# Patient Record
Sex: FEMALE | Race: WHITE | URBAN - METROPOLITAN AREA
[De-identification: names, ages, dates, MRNs, and addresses within clinical notes are randomized per-mention and may not be internally consistent; named-entity substitution may affect disease eponyms.]

---

## 2023-10-17 LAB
C. TRACHOMATIS, EXTERNAL RESULT: NEGATIVE
N. GONORRHOEAE, EXTERNAL RESULT: NEGATIVE

## 2023-11-03 LAB
ABO, EXTERNAL RESULT: NORMAL
HEP B, EXTERNAL RESULT: NEGATIVE
HEPATITIS C ANTIBODY, EXTERNAL RESULT: NEGATIVE
HIV, EXTERNAL RESULT: NORMAL
RH FACTOR, EXTERNAL RESULT: POSITIVE
RPR, EXTERNAL RESULT: NORMAL
RUBELLA TITER, EXTERNAL RESULT: NORMAL

## 2024-05-14 LAB — GBS, EXTERNAL RESULT: NEGATIVE

## 2024-06-06 ENCOUNTER — HOSPITAL ENCOUNTER (INPATIENT)
Age: 24
LOS: 2 days | Discharge: HOME | End: 2024-06-08
Attending: OBSTETRICS & GYNECOLOGY | Admitting: OBSTETRICS & GYNECOLOGY
Payer: COMMERCIAL

## 2024-06-06 ENCOUNTER — ANESTHESIA (OUTPATIENT)
Dept: LABOR AND DELIVERY | Age: 24
End: 2024-06-06
Payer: COMMERCIAL

## 2024-06-06 ENCOUNTER — ANESTHESIA EVENT (OUTPATIENT)
Dept: LABOR AND DELIVERY | Age: 24
End: 2024-06-06
Payer: COMMERCIAL

## 2024-06-06 PROBLEM — Z37.9 NORMAL LABOR: Status: ACTIVE | Noted: 2024-06-06

## 2024-06-06 LAB
ABO + RH BLD: NORMAL
BASOPHILS # BLD: 0.1 K/UL (ref 0–0.2)
BASOPHILS NFR BLD: 0.5 %
BLD GP AB SCN SERPL QL: NORMAL
DEPRECATED RDW RBC AUTO: 13.9 % (ref 12.4–15.4)
EOSINOPHIL # BLD: 0 K/UL (ref 0–0.6)
EOSINOPHIL NFR BLD: 0.1 %
HCT VFR BLD AUTO: 41.4 % (ref 36–48)
HGB BLD-MCNC: 14 G/DL (ref 12–16)
LYMPHOCYTES # BLD: 1.3 K/UL (ref 1–5.1)
LYMPHOCYTES NFR BLD: 7.5 %
MCH RBC QN AUTO: 28.5 PG (ref 26–34)
MCHC RBC AUTO-ENTMCNC: 33.8 G/DL (ref 31–36)
MCV RBC AUTO: 84.2 FL (ref 80–100)
MONOCYTES # BLD: 0.6 K/UL (ref 0–1.3)
MONOCYTES NFR BLD: 3.6 %
NEUTROPHILS # BLD: 15.5 K/UL (ref 1.7–7.7)
NEUTROPHILS NFR BLD: 88.3 %
PLATELET # BLD AUTO: 234 K/UL (ref 135–450)
PMV BLD AUTO: 9.3 FL (ref 5–10.5)
RBC # BLD AUTO: 4.91 M/UL (ref 4–5.2)
WBC # BLD AUTO: 17.6 K/UL (ref 4–11)

## 2024-06-06 PROCEDURE — 85025 COMPLETE CBC W/AUTO DIFF WBC: CPT

## 2024-06-06 PROCEDURE — 10907ZC DRAINAGE OF AMNIOTIC FLUID, THERAPEUTIC FROM PRODUCTS OF CONCEPTION, VIA NATURAL OR ARTIFICIAL OPENING: ICD-10-PCS | Performed by: OBSTETRICS & GYNECOLOGY

## 2024-06-06 PROCEDURE — 0HQ9XZZ REPAIR PERINEUM SKIN, EXTERNAL APPROACH: ICD-10-PCS | Performed by: OBSTETRICS & GYNECOLOGY

## 2024-06-06 PROCEDURE — 6360000002 HC RX W HCPCS: Performed by: NURSE ANESTHETIST, CERTIFIED REGISTERED

## 2024-06-06 PROCEDURE — 86780 TREPONEMA PALLIDUM: CPT

## 2024-06-06 PROCEDURE — 86850 RBC ANTIBODY SCREEN: CPT

## 2024-06-06 PROCEDURE — 86901 BLOOD TYPING SEROLOGIC RH(D): CPT

## 2024-06-06 PROCEDURE — 86900 BLOOD TYPING SEROLOGIC ABO: CPT

## 2024-06-06 PROCEDURE — 1220000000 HC SEMI PRIVATE OB R&B

## 2024-06-06 PROCEDURE — 3700000025 EPIDURAL BLOCK: Performed by: ANESTHESIOLOGY

## 2024-06-06 PROCEDURE — 2500000003 HC RX 250 WO HCPCS: Performed by: NURSE ANESTHETIST, CERTIFIED REGISTERED

## 2024-06-06 RX ORDER — ACETAMINOPHEN 325 MG/1
650 TABLET ORAL EVERY 4 HOURS PRN
Status: DISCONTINUED | OUTPATIENT
Start: 2024-06-06 | End: 2024-06-06

## 2024-06-06 RX ORDER — CARBOPROST TROMETHAMINE 250 UG/ML
250 INJECTION, SOLUTION INTRAMUSCULAR PRN
Status: DISCONTINUED | OUTPATIENT
Start: 2024-06-06 | End: 2024-06-08 | Stop reason: HOSPADM

## 2024-06-06 RX ORDER — SODIUM CHLORIDE 0.9 % (FLUSH) 0.9 %
5-40 SYRINGE (ML) INJECTION PRN
Status: DISCONTINUED | OUTPATIENT
Start: 2024-06-06 | End: 2024-06-06

## 2024-06-06 RX ORDER — TRANEXAMIC ACID 10 MG/ML
1000 INJECTION, SOLUTION INTRAVENOUS
Status: ACTIVE | OUTPATIENT
Start: 2024-06-06 | End: 2024-06-07

## 2024-06-06 RX ORDER — SODIUM CHLORIDE 0.9 % (FLUSH) 0.9 %
5-40 SYRINGE (ML) INJECTION PRN
Status: DISCONTINUED | OUTPATIENT
Start: 2024-06-06 | End: 2024-06-08 | Stop reason: HOSPADM

## 2024-06-06 RX ORDER — BUPIVACAINE HYDROCHLORIDE 2.5 MG/ML
INJECTION, SOLUTION EPIDURAL; INFILTRATION; INTRACAUDAL PRN
Status: DISCONTINUED | OUTPATIENT
Start: 2024-06-06 | End: 2024-06-06 | Stop reason: SDUPTHER

## 2024-06-06 RX ORDER — LANOLIN 100 %
OINTMENT (GRAM) TOPICAL PRN
Status: DISCONTINUED | OUTPATIENT
Start: 2024-06-06 | End: 2024-06-08 | Stop reason: HOSPADM

## 2024-06-06 RX ORDER — ONDANSETRON 2 MG/ML
4 INJECTION INTRAMUSCULAR; INTRAVENOUS EVERY 6 HOURS PRN
Status: DISCONTINUED | OUTPATIENT
Start: 2024-06-06 | End: 2024-06-08 | Stop reason: HOSPADM

## 2024-06-06 RX ORDER — SODIUM CHLORIDE, SODIUM LACTATE, POTASSIUM CHLORIDE, AND CALCIUM CHLORIDE .6; .31; .03; .02 G/100ML; G/100ML; G/100ML; G/100ML
500 INJECTION, SOLUTION INTRAVENOUS PRN
Status: DISCONTINUED | OUTPATIENT
Start: 2024-06-06 | End: 2024-06-06

## 2024-06-06 RX ORDER — SODIUM CHLORIDE, SODIUM LACTATE, POTASSIUM CHLORIDE, CALCIUM CHLORIDE 600; 310; 30; 20 MG/100ML; MG/100ML; MG/100ML; MG/100ML
INJECTION, SOLUTION INTRAVENOUS CONTINUOUS
Status: DISCONTINUED | OUTPATIENT
Start: 2024-06-06 | End: 2024-06-08 | Stop reason: HOSPADM

## 2024-06-06 RX ORDER — SODIUM CHLORIDE, SODIUM LACTATE, POTASSIUM CHLORIDE, AND CALCIUM CHLORIDE .6; .31; .03; .02 G/100ML; G/100ML; G/100ML; G/100ML
1000 INJECTION, SOLUTION INTRAVENOUS PRN
Status: DISCONTINUED | OUTPATIENT
Start: 2024-06-06 | End: 2024-06-06

## 2024-06-06 RX ORDER — ONDANSETRON 4 MG/1
4 TABLET, ORALLY DISINTEGRATING ORAL EVERY 6 HOURS PRN
Status: DISCONTINUED | OUTPATIENT
Start: 2024-06-06 | End: 2024-06-08 | Stop reason: HOSPADM

## 2024-06-06 RX ORDER — TERBUTALINE SULFATE 1 MG/ML
0.25 INJECTION, SOLUTION SUBCUTANEOUS
Status: DISCONTINUED | OUTPATIENT
Start: 2024-06-06 | End: 2024-06-06

## 2024-06-06 RX ORDER — FERROUS SULFATE 325(65) MG
325 TABLET ORAL
COMMUNITY

## 2024-06-06 RX ORDER — SODIUM CHLORIDE 0.9 % (FLUSH) 0.9 %
5-40 SYRINGE (ML) INJECTION EVERY 12 HOURS SCHEDULED
Status: DISCONTINUED | OUTPATIENT
Start: 2024-06-06 | End: 2024-06-08 | Stop reason: HOSPADM

## 2024-06-06 RX ORDER — METHYLERGONOVINE MALEATE 0.2 MG/ML
200 INJECTION INTRAVENOUS PRN
Status: DISCONTINUED | OUTPATIENT
Start: 2024-06-06 | End: 2024-06-08 | Stop reason: HOSPADM

## 2024-06-06 RX ORDER — SODIUM CHLORIDE 9 MG/ML
25 INJECTION, SOLUTION INTRAVENOUS PRN
Status: DISCONTINUED | OUTPATIENT
Start: 2024-06-06 | End: 2024-06-06

## 2024-06-06 RX ORDER — SODIUM CHLORIDE 0.9 % (FLUSH) 0.9 %
5-40 SYRINGE (ML) INJECTION EVERY 12 HOURS SCHEDULED
Status: DISCONTINUED | OUTPATIENT
Start: 2024-06-06 | End: 2024-06-06

## 2024-06-06 RX ORDER — IBUPROFEN 800 MG/1
800 TABLET ORAL EVERY 8 HOURS SCHEDULED
Status: DISCONTINUED | OUTPATIENT
Start: 2024-06-07 | End: 2024-06-08 | Stop reason: HOSPADM

## 2024-06-06 RX ORDER — FERROUS SULFATE 325(65) MG
325 TABLET ORAL EVERY OTHER DAY
Status: DISCONTINUED | OUTPATIENT
Start: 2024-06-07 | End: 2024-06-08 | Stop reason: HOSPADM

## 2024-06-06 RX ORDER — MISOPROSTOL 100 UG/1
400 TABLET ORAL PRN
Status: DISCONTINUED | OUTPATIENT
Start: 2024-06-06 | End: 2024-06-08 | Stop reason: HOSPADM

## 2024-06-06 RX ORDER — KETOROLAC TROMETHAMINE 30 MG/ML
30 INJECTION, SOLUTION INTRAMUSCULAR; INTRAVENOUS EVERY 6 HOURS
Status: DISPENSED | OUTPATIENT
Start: 2024-06-06 | End: 2024-06-07

## 2024-06-06 RX ORDER — SODIUM CHLORIDE 9 MG/ML
INJECTION, SOLUTION INTRAVENOUS PRN
Status: DISCONTINUED | OUTPATIENT
Start: 2024-06-06 | End: 2024-06-08 | Stop reason: HOSPADM

## 2024-06-06 RX ORDER — ACETAMINOPHEN 500 MG
1000 TABLET ORAL EVERY 8 HOURS SCHEDULED
Status: DISCONTINUED | OUTPATIENT
Start: 2024-06-06 | End: 2024-06-08 | Stop reason: HOSPADM

## 2024-06-06 RX ORDER — DOCUSATE SODIUM 100 MG/1
100 CAPSULE, LIQUID FILLED ORAL 2 TIMES DAILY
Status: DISCONTINUED | OUTPATIENT
Start: 2024-06-06 | End: 2024-06-08 | Stop reason: HOSPADM

## 2024-06-06 RX ADMIN — Medication 15 ML/HR: at 20:15

## 2024-06-06 RX ADMIN — BUPIVACAINE HYDROCHLORIDE 1.3 ML: 2.5 INJECTION, SOLUTION EPIDURAL; INFILTRATION; INTRACAUDAL; PERINEURAL at 20:06

## 2024-06-06 NOTE — PROGRESS NOTES
Pt arrived to triage sent over from the office due to contractions every 5 minutes that she feels in her lower abdomen in back. Pt rates her pain 5/10. Baby has been active and denies vaginal bleeding and gushes of fluid

## 2024-06-06 NOTE — H&P
Department of Obstetrics and Gynecology   Obstetrics History and Physical        CHIEF COMPLAINT:  contractions    HISTORY OF PRESENT ILLNESS:      The patient is a 23 y.o. female at 39w2d.  OB History          1    Para        Term                AB        Living             SAB        IAB        Ectopic        Molar        Multiple        Live Births                Patient presents with a chief complaint as above and is being admitted for active phase labor    Estimated Due Date: Estimated Date of Delivery: 24    PRENATAL CARE:    Complicated by: none    PAST OB HISTORY:  OB History          1    Para        Term                AB        Living             SAB        IAB        Ectopic        Molar        Multiple        Live Births                    Past Medical History:    No past medical history on file.  Past Surgical History:        Procedure Laterality Date    WISDOM TOOTH EXTRACTION       Allergies:  Penicillins    Social History:    Social History     Socioeconomic History    Marital status:      Spouse name: Not on file    Number of children: Not on file    Years of education: Not on file    Highest education level: Not on file   Occupational History    Not on file   Tobacco Use    Smoking status: Never    Smokeless tobacco: Never   Substance and Sexual Activity    Alcohol use: Not Currently    Drug use: Never    Sexual activity: Yes     Partners: Male   Other Topics Concern    Not on file   Social History Narrative    Not on file     Social Determinants of Health     Financial Resource Strain: Not on file   Food Insecurity: Not on file   Transportation Needs: Not on file   Physical Activity: Not on file   Stress: Not on file   Social Connections: Not on file   Intimate Partner Violence: Not on file   Housing Stability: Not on file     Family History:   No family history on file.  Medications Prior to Admission:  Medications Prior to Admission: ferrous sulfate

## 2024-06-06 NOTE — PROGRESS NOTES
Pt off monitor to walk halls again. Pt lives [place] hour away and really uncomfortable. Does not want to leave at this time. This RN notified Dr Ibarra. Will reassess SVE in a couple of hours.

## 2024-06-07 LAB — REAGIN+T PALLIDUM IGG+IGM SERPL-IMP: NORMAL

## 2024-06-07 PROCEDURE — 1220000000 HC SEMI PRIVATE OB R&B

## 2024-06-07 PROCEDURE — 2580000003 HC RX 258: Performed by: OBSTETRICS & GYNECOLOGY

## 2024-06-07 PROCEDURE — 6360000002 HC RX W HCPCS: Performed by: OBSTETRICS & GYNECOLOGY

## 2024-06-07 PROCEDURE — 6370000000 HC RX 637 (ALT 250 FOR IP): Performed by: OBSTETRICS & GYNECOLOGY

## 2024-06-07 PROCEDURE — 7200000001 HC VAGINAL DELIVERY

## 2024-06-07 RX ADMIN — ACETAMINOPHEN 1000 MG: 500 TABLET ORAL at 16:28

## 2024-06-07 RX ADMIN — DOCUSATE SODIUM 100 MG: 100 CAPSULE, LIQUID FILLED ORAL at 21:14

## 2024-06-07 RX ADMIN — IBUPROFEN 800 MG: 800 TABLET, FILM COATED ORAL at 21:13

## 2024-06-07 RX ADMIN — KETOROLAC TROMETHAMINE 30 MG: 30 INJECTION, SOLUTION INTRAMUSCULAR at 04:31

## 2024-06-07 RX ADMIN — IBUPROFEN 800 MG: 800 TABLET, FILM COATED ORAL at 12:37

## 2024-06-07 RX ADMIN — ACETAMINOPHEN 1000 MG: 500 TABLET ORAL at 08:21

## 2024-06-07 RX ADMIN — ACETAMINOPHEN 1000 MG: 500 TABLET ORAL at 23:56

## 2024-06-07 RX ADMIN — Medication 10 ML: at 21:14

## 2024-06-07 RX ADMIN — DOCUSATE SODIUM 100 MG: 100 CAPSULE, LIQUID FILLED ORAL at 04:33

## 2024-06-07 ASSESSMENT — PAIN DESCRIPTION - LOCATION
LOCATION: PERINEUM;BACK
LOCATION: ABDOMEN
LOCATION: ABDOMEN;BACK
LOCATION: ABDOMEN;BACK

## 2024-06-07 ASSESSMENT — PAIN - FUNCTIONAL ASSESSMENT
PAIN_FUNCTIONAL_ASSESSMENT: ACTIVITIES ARE NOT PREVENTED

## 2024-06-07 ASSESSMENT — PAIN SCALES - GENERAL
PAINLEVEL_OUTOF10: 3
PAINLEVEL_OUTOF10: 2
PAINLEVEL_OUTOF10: 2
PAINLEVEL_OUTOF10: 3
PAINLEVEL_OUTOF10: 2
PAINLEVEL_OUTOF10: 2

## 2024-06-07 ASSESSMENT — PAIN DESCRIPTION - DESCRIPTORS
DESCRIPTORS: DISCOMFORT;SORE
DESCRIPTORS: CRAMPING

## 2024-06-07 ASSESSMENT — PAIN DESCRIPTION - ORIENTATION
ORIENTATION: LOWER
ORIENTATION: LOWER

## 2024-06-07 NOTE — LACTATION NOTE
This note was copied from a baby's chart.  LACTATION CONSULTATION      Follow-up Consult: Reason for Follow-up: education, latch assist       Name: Girl Charity Matthews       MRN: [id number]               YOB: 2024   Time of Birth: 9:38 PM   Gestational age: Gestational Age: 39w2d   Birth Weight: Birth Weight: 3.218 kg (7 lb 1.5 oz) Most Recent Weight: Weight: 3.218 kg (7 lb 1.5 oz) (Filed from Delivery Summary)   Weight Change from Birth: 0%            Maternal Assessment:      Maternal Data:   Information for the patient's mother:  Charity Matthews [[id number]]   23 y.o.    /Para:   Information for the patient's mother:  Charity Matthews [[id number]]        Information for the patient's mother:  Charity Matthews [[id number]]   39w2d       Intervention during consultation:     Interventions Performed:   Assisted with breastfeeding , Hand expression, and Education     Latch & Positioning: MOB with infant latched to the left breast in a football hold with the assist of FOB upon LC entry to room. Infant appeared to be latched shallow. Assisted to improve position and latch. Reviewed position and hold. Reviewed nipple to nose, compression on the breast and bringing infant up and onto the breast for latch. Infant able to latch well with short to moderate coordinated suck bursts, long jaw motions and swallows. Infant on the breast for several minutes and then MOB reported feeling a gush of blood. Called RN to evaluate so infant taken off the breast and to FOB to hold while MOB assisted by RN. Encouraged to call for continued assist.     Manual Expression:  Expresses easily, Drops obtained, and MOB states she understands     Education:  Latch & positioning  and Feeding frequency & feeding cues           Action/Plan:  Breastfeed on cue and at least 8 times/24 hours, unlimited timing. May not nurse this often in the first 24 hours. Wake baby and offer breastfeeding if it has been 3

## 2024-06-07 NOTE — BRIEF OP NOTE
Brief Postoperative Note      Patient: Charity Matthews  YOB: 2000  MRN: [id number]    Date of Procedure:     , VFI  Apgars   No epis/2nd degree perineal lac  Placenta spon   cc  \""    Electronically signed by KAPIL VICENTE MD on 2024 at 10:00 PM  
- - -

## 2024-06-07 NOTE — LACTATION NOTE
This note was copied from a baby's chart.  LACTATION CONSULTATION  Initial Lactation Consult:  Referred by: RN request and Census- feeding preference     Name: Girl Charity Matthews       MRN: [id number]         YOB: 2024   Time of Birth: 9:38 PM   Gestational age: Gestational Age: 39w2d   Birth Weight: Birth Weight: 3.218 kg (7 lb 1.5 oz) Most Recent Weight: Weight: 3.218 kg (7 lb 1.5 oz) (Filed from Delivery Summary)   Weight Change from Birth: 0%           Maternal Assessment:  Maternal Data:  Information for the patient's mother:  Kasey Matthewsjoleen HAY [[id number]]   23 y.o.   /Para:   Information for the patient's mother:  Charity Matthews SATNAM [[id number]]      Information for the patient's mother:  Kasey Matthewstney SATNAM [[id number]]   39w2d     Prenatal Breastfeeding Education: None    Prior Breastfeeding Experience: 1st time breastfeeding with this baby     Breastfeeding Goal: Exclusively Breastfeed     Breast Assessment  Right Breast: WDL  Right Nipple: Everts well  and Short  Right Areola: WDL   Right Nipple Comfort: 1st minute or less of breastfeeding   Right Nipple Integrity: Intact    Left Breast: WDL  Left Nipple: Everts well  and Short  Left Areola: WDL   Left Nipple Comfort: 1st minute or less of breastfeeding   Left Nipple Integrity: Intact    Medications of Concern:None    Maternal Toxicology:   Information for the patient's mother:  Charity Matthews [[id number]]   No results found for: \"LABAMPH\", \"BARBSCNU\", \"LABBENZ\", \"CANSU\", \"COCAIMETSCRU\", \"LABMETH\", \"PROPOX\", \"PHUR\", \"DSCOMMENT\"     Other significant maternal history: High BMI     Delivery Information:  Born on 2024 at 9:38 PM  Delivery method: Vaginal, Spontaneous [250]  Additional Information:  Forceps attempted? No [0]  Vacuum extractor attempted? No [0]    Infant Assessment:    DOL:1 hour  Feeding: Breastfeeding     Nipple Shield in Use: No    I&O adequacy:  Urine output: is not established  Stool output: is not  Size:   N/A    Amount of milk expressed:   N/A    Pump Arrangements:  Owns breast pump    Education:  Latch & positioning , Feeding frequency & feeding cues , Exclusive breastfeeding , Breastfeeding Booklet reviewed , No artificial nipples , Expected intake and output , Feeding and diaper log , Skin to skin , Engorgement prevention & management , Electric breast pump , Milk storage guidelines , Hand expression , [facility] , and [facility]     Action/Plan:  Breastfeed on cue and at least 8 times/24 hours, unlimited timing. May not nurse this often in the first 24 hours. Wake baby and offer breastfeeding if it has been 3 hours since the beginning of last feeding. Place infant skin to skin if infant will not breastfeed at 3 hours.   Hand express prior to latch to brooke nipple and entice infant to the breast.   It is important to use gentle stimulation during feeding to promote active eating. Offer both breasts at every feeding. Burp infant in between sides. Alternate which breast is used first.   Offer STS often while awake. Mother holding infant skin to skin between feedings will promote milk supply and allow infant to rest more deeply.   Maintain a feeding log until infant is gaining weight and seen by primary care physician.   Request breastfeeding assistance from LC or RN as needed.     Feeding Plan reviewed with: Mom    Response:   Verbalized understanding of education and instruction

## 2024-06-07 NOTE — PLAN OF CARE
Problem: Pain  Goal: Verbalizes/displays adequate comfort level or baseline comfort level  Outcome: Progressing     Problem: Postpartum  Goal: Experiences normal postpartum course  Description:  Postpartum OB-Pregnancy care plan goal which identifies if the mother is experiencing a normal postpartum course  Outcome: Progressing  Goal: Appropriate maternal -  bonding  Description:  Postpartum OB-Pregnancy care plan goal which identifies if the mother and  are bonding appropriately  Outcome: Progressing  Goal: Establishment of infant feeding pattern  Description:  Postpartum OB-Pregnancy care plan goal which identifies if the mother is establishing a feeding pattern with their   Outcome: Progressing  Goal: Incisions, wounds, or drain sites healing without S/S of infection  Outcome: Progressing  Flowsheets (Taken 2024 1019)  Incisions, Wounds, or Drain Sites Healing Without Sign and Symptoms of Infection: TWICE DAILY: Assess and document skin integrity     Problem: Infection - Adult  Goal: Absence of infection at discharge  Outcome: Progressing  Goal: Absence of infection during hospitalization  Outcome: Progressing     Problem: Safety - Adult  Goal: Free from fall injury  Outcome: Progressing     Problem: Discharge Planning  Goal: Discharge to home or other facility with appropriate resources  Outcome: Progressing     Problem: Chronic Conditions and Co-morbidities  Goal: Patient's chronic conditions and co-morbidity symptoms are monitored and maintained or improved  Outcome: Progressing

## 2024-06-07 NOTE — PLAN OF CARE
Problem: Pain  Goal: Verbalizes/displays adequate comfort level or baseline comfort level  2024 by Jacque Aguillno RN  Outcome: Progressing  2024 by Leah Alvarez RN  Outcome: Progressing     Problem: Postpartum  Goal: Experiences normal postpartum course  Description:  Postpartum OB-Pregnancy care plan goal which identifies if the mother is experiencing a normal postpartum course  2024 by Jacque Aguillon RN  Outcome: Progressing  2024 by Leah Alvarez RN  Outcome: Progressing  Goal: Appropriate maternal -  bonding  Description:  Postpartum OB-Pregnancy care plan goal which identifies if the mother and  are bonding appropriately  2024 by Jacque Aguillon RN  Outcome: Progressing  2024 by Leah Alvarez RN  Outcome: Progressing  Goal: Establishment of infant feeding pattern  Description:  Postpartum OB-Pregnancy care plan goal which identifies if the mother is establishing a feeding pattern with their   2024 by Jacque Aguillon RN  Outcome: Progressing  2024 by Leah Alvarez RN  Outcome: Progressing  Goal: Incisions, wounds, or drain sites healing without S/S of infection  2024 by Jacque Aguillon RN  Outcome: Progressing  2024 by Leah Alvarez RN  Outcome: Progressing  Flowsheets (Taken 2024 1019)  Incisions, Wounds, or Drain Sites Healing Without Sign and Symptoms of Infection: TWICE DAILY: Assess and document skin integrity     Problem: Infection - Adult  Goal: Absence of infection at discharge  2024 by Jacque Aguillon RN  Outcome: Progressing  2024 by Leah Alvarez RN  Outcome: Progressing  Goal: Absence of infection during hospitalization  2024 by Jacque Aguillon RN  Outcome: Progressing  2024 by Leah Alvarez RN  Outcome: Progressing     Problem: Safety - Adult  Goal: Free from fall injury  2024 by Jacque Aguillon RN  Outcome:

## 2024-06-07 NOTE — ANESTHESIA PRE PROCEDURE
Department of Anesthesiology  Preprocedure Note       Name:  Charity Matthews   Age:  23 y.o.  :  2000                                          MRN:  [id number]         Date:  2024      Surgeon: * No surgeons listed *    Procedure: * No procedures listed *    Medications prior to admission:   Prior to Admission medications    Medication Sig Start Date End Date Taking? Authorizing Provider   ferrous sulfate (IRON 325) 325 (65 Fe) MG tablet Take 1 tablet by mouth daily (with breakfast)   Yes Jameel Gould MD   Prenatal MV-Min-Fe Fum-FA-DHA (PRENATAL 1 PO) Take by mouth   Yes ProviderJameel MD       Current medications:    Current Facility-Administered Medications   Medication Dose Route Frequency Provider Last Rate Last Admin    terbutaline (BRETHINE) injection 0.25 mg  0.25 mg SubCUTAneous Once PRN Richard Ibarra MD        lactated ringers bolus 500 mL  500 mL IntraVENous PRN Richard Ibarra MD        Or    lactated ringers bolus 1,000 mL  1,000 mL IntraVENous PRN Richard Ibarra MD        sodium chloride flush 0.9 % injection 5-40 mL  5-40 mL IntraVENous 2 times per day Richard Ibarra MD        sodium chloride flush 0.9 % injection 5-40 mL  5-40 mL IntraVENous PRN Richard Ibarra MD        0.9 % sodium chloride infusion  25 mL IntraVENous PRN Richard Ibarra MD        acetaminophen (TYLENOL) tablet 650 mg  650 mg Oral Q4H PRN Richard Ibarra MD         Facility-Administered Medications Ordered in Other Encounters   Medication Dose Route Frequency Provider Last Rate Last Admin    BUPivacaine (PF) (MARCAINE) 0.25 % injection   IntraSPINal PRN Crow Locke APRN - CRNA   1.3 mL at 24    sodium chloride 0.9 % 200 mL with fentaNYL 500 mcg, BUPivacaine 0.5% 50 mL (OB) epidural   Epidural Continuous PRN Crow Locke APRN - CRNA 15 mL/hr at 24 15 mL/hr at 24       Allergies:    Allergies   Allergen Reactions

## 2024-06-07 NOTE — L&D DELIVERY SUMMARY NOTE
[address] [address]                         LABOR AND DELIVERY NOTE      PATIENT NAME: SUZANNA MENDOZA           : 2000  MED REC NO: [id number]                      ROOM: 0387  ACCOUNT NO: [id number]                       ADMIT DATE: 2024  PROVIDER: Richard Vicente MD      DATE OF PROCEDURE:      23-year-old  1 who presented at 39 weeks 2 days in active labor.  The patient's prenatal course was uncomplicated.  Fetal heart tracing was reactive upon admission.  Group B beta strep culture was negative.    The patient did request and receive an epidural.  Amniotomy was performed for clear fluid.  The patient progressed to 2nd stage and after a 30-minute 2nd stage, delivered by spontaneous vaginal delivery of a viable female infant with Apgars 9 at one minute and 9 at five minutes.  No episiotomy was performed but a second-degree perineal laceration occurred with delivery.  Placenta delivered spontaneously grossly intact with a 3-vessel cord.  There were no cervical or vaginal lacerations.  Perineal laceration was repaired in normal fashion with 3-0 Vicryl.  Rectovaginal exam after repair noted intact rectal mucosa and rectal sphincter.  All sponges and needles were accounted for after delivery.  Estimated blood loss was 100 mL.  The patient named her little girl.Myah Potts.          RICHARD VICENTE MD      D:  2024 22:03:02     T:  2024 02:06:05     KLEBER/CHYNA  Job #:  [id number]     Doc#:  [id number]

## 2024-06-07 NOTE — PROGRESS NOTES
Department of Obstetrics and Gynecology  Labor and Delivery  Attending Post Partum Progress Note      SUBJECTIVE:  pt without complaints, lochia=menses, adequate pain control    OBJECTIVE:      Vitals:  /66   Pulse 85   Temp 99 °F (37.2 °C) (Oral)   Resp 16   Ht 1.626 m (5' 4\")   Wt 98.4 kg (217 lb)   SpO2 99%   Breastfeeding Unknown   BMI 37.25 kg/m²     ABDOMEN:  No scars, normal bowel sounds, soft, non-distended, non-tender, no masses palpated, no hepatosplenomegally  GENITAL/URINARY:  deferred    DATA:    CBC:    Lab Results   Component Value Date/Time    WBC 17.6 06/06/2024 07:35 PM    RBC 4.91 06/06/2024 07:35 PM    HGB 14.0 06/06/2024 07:35 PM    HCT 41.4 06/06/2024 07:35 PM    MCV 84.2 06/06/2024 07:35 PM    RDW 13.9 06/06/2024 07:35 PM     06/06/2024 07:35 PM       ASSESSMENT & PLAN:      Principal Problem:    Vaginal delivery PPD#1  Plan: cont nl pp care

## 2024-06-07 NOTE — PROGRESS NOTES
First time get up to BR w/ assist x 2 RN's. Pt voided 50 cc urine without difficulty. Pt performed mayank care per self after instruction. New ice pack, peripad, and underwear provided.  Pt back to bed w/ out incident, gait steady. Pt tolerated well.

## 2024-06-07 NOTE — ANESTHESIA POSTPROCEDURE EVALUATION
Department of Anesthesiology  Postprocedure Note    Patient: Charity Matthews  MRN: [id number]  YOB: 2000  Date of evaluation: 6/7/2024    Procedure Summary       Date: 06/06/24 Room / Location:     Anesthesia Start: 1956 Anesthesia Stop: 2138    Procedure: Labor Analgesia Diagnosis:     Scheduled Providers:  Responsible Provider: Saad Serna MD    Anesthesia Type: CSE ASA Status: 2            Anesthesia Type: No value filed.    Augusto Phase I: Augusto Score: 9    Augusto Phase II: Augusto Score: 10    Anesthesia Post Evaluation    Patient location during evaluation: bedside  Patient participation: complete - patient participated  Level of consciousness: awake and alert  Nausea & Vomiting: no nausea and no vomiting  Cardiovascular status: hemodynamically stable  Hydration status: stable  Pain management: adequate and satisfactory to patient        No notable events documented.

## 2024-06-07 NOTE — ANESTHESIA PROCEDURE NOTES
Epidural Block    Patient location during procedure: OB  Start time: 6/6/2024 7:56 PM  End time: 6/6/2024 8:15 PM  Reason for block: labor epidural  Staffing  Performed: resident/CRNA   Resident/CRNA: Crow Locke APRN - CRNA  Performed by: Crow Locke APRN - CRNA  Authorized by: Saad Serna MD    Epidural  Patient position: sitting  Prep: ChloraPrep  Patient monitoring: continuous pulse ox  Approach: midline  Location: L3-4  Injection technique: BOUCHRA saline  Provider prep: mask  Needle  Needle type: Tuohy   Needle gauge: 17 G  Needle length: 3.5 in  Needle insertion depth: 8 cm  Catheter type: side hole  Catheter size: 19 G  Catheter at skin depth: 13 cm  Test dose: negativeCatheter Secured: tegaderm and tape  Assessment  Sensory level: T8  Hemodynamics: stable  Attempts: 1  Outcomes: uncomplicated and patient tolerated procedure well  Additional Notes  Sitting, Sterile prep/drape, 1%Xylo at L3-4, 17ga Tuohy with BOUCHRA, 25ga Pencan for w/+CSF for CSE, 1.3ml 0.25% marcaine PF intrathecally, Pencan removed, Catheter inserted, negative test dose, sterile dressing applied.   Preanesthetic Checklist  Completed: patient identified, IV checked, site marked, risks and benefits discussed, surgical/procedural consents, equipment checked, pre-op evaluation, timeout performed, anesthesia consent given, oxygen available and monitors applied/VS acknowledged

## 2024-06-07 NOTE — PLAN OF CARE
Problem: Pain  Goal: Verbalizes/displays adequate comfort level or baseline comfort level  Outcome: Progressing     Problem: Postpartum  Goal: Experiences normal postpartum course  Description:  Postpartum OB-Pregnancy care plan goal which identifies if the mother is experiencing a normal postpartum course  Outcome: Progressing  Goal: Appropriate maternal -  bonding  Description:  Postpartum OB-Pregnancy care plan goal which identifies if the mother and  are bonding appropriately  Outcome: Progressing  Goal: Establishment of infant feeding pattern  Description:  Postpartum OB-Pregnancy care plan goal which identifies if the mother is establishing a feeding pattern with their   Outcome: Progressing  Goal: Incisions, wounds, or drain sites healing without S/S of infection  Outcome: Progressing     Problem: Infection - Adult  Goal: Absence of infection at discharge  Outcome: Progressing  Goal: Absence of infection during hospitalization  Outcome: Progressing     Problem: Safety - Adult  Goal: Free from fall injury  Outcome: Progressing     Problem: Discharge Planning  Goal: Discharge to home or other facility with appropriate resources  Outcome: Progressing     Problem: Chronic Conditions and Co-morbidities  Goal: Patient's chronic conditions and co-morbidity symptoms are monitored and maintained or improved  Outcome: Progressing

## 2024-06-08 VITALS
WEIGHT: 217 LBS | TEMPERATURE: 97.5 F | RESPIRATION RATE: 16 BRPM | BODY MASS INDEX: 37.05 KG/M2 | HEART RATE: 91 BPM | OXYGEN SATURATION: 98 % | HEIGHT: 64 IN | SYSTOLIC BLOOD PRESSURE: 125 MMHG | DIASTOLIC BLOOD PRESSURE: 78 MMHG

## 2024-06-08 LAB
DEPRECATED RDW RBC AUTO: 14.2 % (ref 12.4–15.4)
HCT VFR BLD AUTO: 36.2 % (ref 36–48)
HGB BLD-MCNC: 11.8 G/DL (ref 12–16)
MCH RBC QN AUTO: 27.8 PG (ref 26–34)
MCHC RBC AUTO-ENTMCNC: 32.6 G/DL (ref 31–36)
MCV RBC AUTO: 85.4 FL (ref 80–100)
PLATELET # BLD AUTO: 214 K/UL (ref 135–450)
PMV BLD AUTO: 8.8 FL (ref 5–10.5)
RBC # BLD AUTO: 4.24 M/UL (ref 4–5.2)
WBC # BLD AUTO: 12.5 K/UL (ref 4–11)

## 2024-06-08 PROCEDURE — 85027 COMPLETE CBC AUTOMATED: CPT

## 2024-06-08 PROCEDURE — 36415 COLL VENOUS BLD VENIPUNCTURE: CPT

## 2024-06-08 PROCEDURE — 6370000000 HC RX 637 (ALT 250 FOR IP): Performed by: OBSTETRICS & GYNECOLOGY

## 2024-06-08 RX ORDER — IBUPROFEN 800 MG/1
800 TABLET ORAL EVERY 8 HOURS PRN
Qty: 60 TABLET | Refills: 0 | Status: SHIPPED | OUTPATIENT
Start: 2024-06-08

## 2024-06-08 RX ADMIN — DOCUSATE SODIUM 100 MG: 100 CAPSULE, LIQUID FILLED ORAL at 08:51

## 2024-06-08 RX ADMIN — ACETAMINOPHEN 1000 MG: 500 TABLET ORAL at 08:51

## 2024-06-08 RX ADMIN — IBUPROFEN 800 MG: 800 TABLET, FILM COATED ORAL at 04:39

## 2024-06-08 ASSESSMENT — PAIN SCALES - GENERAL
PAINLEVEL_OUTOF10: 3
PAINLEVEL_OUTOF10: 2

## 2024-06-08 NOTE — DISCHARGE INSTRUCTIONS
Thank you for the opportunity to care for you and your family.    We hope that you are happy with the care we provided during your stay in the [facility]. We want to ensure that you have the help you need when you leave the hospital. If there is anything we can assist you with, please let us know.    Breastfeeding mothers may contact our lactation specialists with any problems or questions.  [facility] lactation services phone number is [phone].  Leave a message and your call will be returned.    Please refer to the information provided in the postpartum care booklet.  The following are warning signs to remember.    Call 911 if you have:    Chest pain or pressure  Shortness of breath, even at rest  Thoughts of harming yourself or others  Seizures    Call your healthcare provider if you have:    Temperature of 100.4 degrees or higher  Stitches that are not healing        -- Swelling, bleeding, drainage, foul odor, redness or warmth in/around your           stitches, staples, or incision (scar)        -- Bad smelling blood or discharge from the vagina  Vaginal bleeding that has increased         -- Soaking through one pad in an hour        -- You are passing clots larger than the size of a lemon  Red, warm tender area(s) in your breast or calf  Headache that does not get better, even after taking medicine; or headache with vision changes    Remember to notify all healthcare providers from your date of delivery to up to one year after giving birth!    CARING FOR YOURSELF        DIET/ACTIVITY    Eat a well balanced diet focusing on foods high in fiber and protein.  Drink plenty of fluids, especially water.  To avoid constipation you may take a mild stool softener as recommended by your doctor or midwife.  Gradually increase your activity. Resume an exercise regime only after being advised by your doctor or midwife.  When sitting or lying down, keep your legs elevated to reduce swelling.  Avoid

## 2024-06-08 NOTE — DISCHARGE SUMMARY
Obstetrical Discharge Form    Gestational Age:39w2d    Antepartum complications: none    Date of Delivery: 24    Type of Delivery: vaginal, spontaneous    Delivered By:  Dr. Ibarra         Baby:   Information for the patient's :  Antony Matthews [[id number]]      Anesthesia: Epidural    Intrapartum complications: None    Postpartum complications: none    Discharge Medication:      Medication List        START taking these medications      ibuprofen 800 MG tablet  Commonly known as: ADVIL;MOTRIN  Take 1 tablet by mouth every 8 hours as needed for Pain            ASK your doctor about these medications      ferrous sulfate 325 (65 Fe) MG tablet  Commonly known as: IRON 325     PRENATAL 1 PO               Where to Get Your Medications        You can get these medications from any pharmacy    Bring a paper prescription for each of these medications  ibuprofen 800 MG tablet          Discharge Date: 24    Condition on discharge: Stable    Plan:   Follow up in 6 week(s)  Reviewed discharge instructions and scripts     Miranda Montano MD

## 2024-06-08 NOTE — LACTATION NOTE
This note was copied from a baby's chart.  LACTATION CONSULTATION      Follow-up Consult: Reason for Follow-up: assess needs       Name: Girl Charity Matthews       MRN: [id number]               YOB: 2024   Time of Birth: 9:38 PM   Gestational age: Gestational Age: 39w2d   Birth Weight: Birth Weight: 3.218 kg (7 lb 1.5 oz) Most Recent Weight: Weight: 3.218 kg (7 lb 1.5 oz) (Filed from Delivery Summary)   Weight Change from Birth: 0%            Maternal Assessment:      Maternal Data:   Information for the patient's mother:  Charity Matthews [[id number]]   23 y.o.    /Para:   Information for the patient's mother:  Charity Matthews [[id number]]        Information for the patient's mother:  Charity Matthews [[id number]]   39w2d          Breast Assessment  Right Breast: Breasts not assessed this encounter     Left Breast: Breasts not assessed this encounter      Infant Assessment:    DOL:21 hours       Feeding: Breastfeeding      Nipple Shield in Use: No    I&O adequacy:  Urine output: is established  Stool output: is established  Percent weight change from birthweight: 0%     Oral Assessment:   Oral assessment not completed at this time.     Intervention during consultation:     Interventions Performed:   Education     Latch & Positioning: MOB reports doing well at this time. Encouraged to call for lactation assist. Name and number on white board.     Pump Arrangements:  Owns breast pump    Education:  Encouraged to call for lactation assist           Action/Plan:  Breastfeed on cue and at least 8 times/24 hours, unlimited timing. May not nurse this often in the first 24 hours. Wake baby and offer breastfeeding if it has been 3 hours since the beginning of last feeding. Place infant skin to skin if infant will not breastfeed at 3 hours.   Hand express prior to latch to brooke nipple and entice infant to the breast.   It is important to use gentle stimulation during feeding to promote  active eating. Offer both breasts at every feeding. Burp infant in between sides. Alternate which breast is used first.   Offer STS often while awake. Mother holding infant skin to skin between feedings will promote milk supply and allow infant to rest more deeply.   Maintain a feeding log until infant is gaining weight and seen by primary care physician.   Request breastfeeding assistance from LC or RN as needed.     Feeding Plan reviewed with: Mom    Response:   Verbalized understanding of education and instruction

## 2024-06-08 NOTE — LACTATION NOTE
This note was copied from a baby's chart.  LACTATION CONSULTATION      Follow-up Consult: Reason for Follow-up per patient request. Discharge teaching, observe infant latch and positioning to breast.          Name: Girl Charity Matthews       MRN: [id number]               YOB: 2024   Time of Birth: 9:38 PM   Gestational age: Gestational Age: 39w2d   Birth Weight: Birth Weight: 3.218 kg (7 lb 1.5 oz) Most Recent Weight: Weight: 3.108 kg (6 lb 13.6 oz)   Weight Change from Birth: -3%            Maternal Assessment:      Maternal Data:   Information for the patient's mother:  Charity Matthews [[id number]]   23 y.o.    /Para:   Information for the patient's mother:  Charity Matthews [[id number]]        Information for the patient's mother:  Charity Matthews [[id number]]   39w2d          Breast Assessment  Right Breast: Filling  Right Nipple: Everts well , Short, and Flat   Right Areola: WDL   Right Nipple Comfort: Pain improved with position and latch assistance  Right Nipple Integrity: Sore    Left Breast: Filling  Left Nipple: Everts well , Short, and Flat   Left Areola: WDL   Left Nipple Comfort: sore  Left Nipple Integrity: Sore     Infant Assessment:    DOL:Infant 36 hours old.      Feeding: Breastfeeding      Nipple Shield in Use: No     I&O adequacy:  Urine output: is established  Stool output: is established  Percent weight change from birthweight: -3%     Oral Assessment:   Palate:intact   Frenulum:did not observe   Frenotomy Performed: n/a     Birth Factors/Diagnosis that could create risk for breastfeeding:       Glucose:  No     Intervention during consultation:     Interventions Performed:   Assisted with breastfeeding , Hand expression, Lanolin provided and instructed on use, Hydrogel pads, Breast shells provided and instructed on use , Education , and Skin to skin     Latch & Positioning: Infant at right breast at time of consult with shallow latch observed. This was

## 2024-06-08 NOTE — PROGRESS NOTES
[facility]  Labor and Delivery   Post Partum Progress Note      SUBJECTIVE:  PPD#2 s/p - female  Doing well   Breastfeeding     OBJECTIVE:      Vitals:  Vitals:    24 0849   BP: 125/78   Pulse: 91   Resp: 16   Temp: 97.5 °F (36.4 °C)   SpO2:         Fundus firm, normal lochia  Extremities normal      DATA:    CBC:    Lab Results   Component Value Date/Time    WBC 12.5 2024 07:59 AM    RBC 4.24 2024 07:59 AM    HGB 11.8 2024 07:59 AM    HCT 36.2 2024 07:59 AM    MCV 85.4 2024 07:59 AM    RDW 14.2 2024 07:59 AM     2024 07:59 AM       ASSESSMENT & PLAN:      PPD#2 s/p - female  Dc home today, script for motrin, fu in office in 4-6 weeks for pp visit     Miranda Montano MD